# Patient Record
Sex: FEMALE | Race: WHITE | NOT HISPANIC OR LATINO | URBAN - NONMETROPOLITAN AREA
[De-identification: names, ages, dates, MRNs, and addresses within clinical notes are randomized per-mention and may not be internally consistent; named-entity substitution may affect disease eponyms.]

---

## 2019-02-25 ENCOUNTER — IMPORTED ENCOUNTER (OUTPATIENT)
Dept: URBAN - NONMETROPOLITAN AREA CLINIC 1 | Facility: CLINIC | Age: 59
End: 2019-02-25

## 2019-02-25 PROCEDURE — 92014 COMPRE OPH EXAM EST PT 1/>: CPT

## 2019-02-25 NOTE — PATIENT DISCUSSION
*Diabetic S  Retinopathy:. -  Discussed findings of exam in detail with the patient. -  discussed the risk of diabetic damage of the retina with potential vision loss and the importance of routine follow-up. Hyperopia-Discussed diagnosis with patient. Presbyopia-Discussed diagnosis with patient. Updated spec Rx given. Recommend lens that will provide comfort as well as protect safety and health of eyes.

## 2020-02-26 ENCOUNTER — IMPORTED ENCOUNTER (OUTPATIENT)
Dept: URBAN - NONMETROPOLITAN AREA CLINIC 1 | Facility: CLINIC | Age: 60
End: 2020-02-26

## 2020-02-26 PROCEDURE — 92015 DETERMINE REFRACTIVE STATE: CPT

## 2020-02-26 PROCEDURE — 92014 COMPRE OPH EXAM EST PT 1/>: CPT

## 2021-03-03 ENCOUNTER — IMPORTED ENCOUNTER (OUTPATIENT)
Dept: URBAN - NONMETROPOLITAN AREA CLINIC 1 | Facility: CLINIC | Age: 61
End: 2021-03-03

## 2021-03-03 PROCEDURE — 92014 COMPRE OPH EXAM EST PT 1/>: CPT

## 2021-03-03 NOTE — PATIENT DISCUSSION
DM s DR-Stressed the importance of keeping blood sugars under control blood pressure under control and weight normalization and regular visits with PCP. -Explained the possible effects of poorly controlled diabetes and the damage that diabetes can cause to ocular health. -Patient to check HgbA1C.-Pt instructed to contact our office with any vision changes. Hyperopia-Discussed diagnosis with patient. Presbyopia-Discussed diagnosis with patient. Updated spec Rx given. Recommend lens that will provide comfort as well as protect safety and health of eyes.

## 2021-08-02 ENCOUNTER — IMPORTED ENCOUNTER (OUTPATIENT)
Dept: URBAN - NONMETROPOLITAN AREA CLINIC 1 | Facility: CLINIC | Age: 61
End: 2021-08-02

## 2021-08-02 PROBLEM — H16.223: Noted: 2021-08-02

## 2021-08-02 PROBLEM — H52.4: Noted: 2021-08-02

## 2021-08-02 PROBLEM — H25.813: Noted: 2021-08-02

## 2021-08-02 PROBLEM — H52.03: Noted: 2021-08-02

## 2021-08-02 PROCEDURE — 92015 DETERMINE REFRACTIVE STATE: CPT

## 2021-08-02 PROCEDURE — 92014 COMPRE OPH EXAM EST PT 1/>: CPT

## 2021-08-02 NOTE — PATIENT DISCUSSION
DM s DR-Stressed the importance of keeping blood sugars under control blood pressure under control and weight normalization and regular visits with PCP. -Explained the possible effects of poorly controlled diabetes and the damage that diabetes can cause to ocular health. -Patient to check HgbA1C.-Pt instructed to contact our office with any vision changes. Cataract OU-Not yet surgical. -Reviewed symptoms of advancing cataract growth such as glare and halos and decreased vision.-Continue to monitor for now. Pt will notify us if any new symptoms develop. Jason Discussed restasis and xiidra START Restasis bid OU sent to jonas Hyperopia-Discussed diagnosis with patient. Updated spec Rx given. Recommend lens that will provide comfort as well as protect safety and health of eyes.

## 2022-04-09 ASSESSMENT — VISUAL ACUITY
OS_SC: 20/25
OU_CC: 20/20
OD_SC: 20/25
OS_SC: 20/25
OD_SC: 20/25
OD_SC: 20/20
OD_SC: 20/25
OD_CC: J1
OS_PH: 20/30
OS_CC: J1
OU_CC: J1+
OS_CC: 20/80-1
OS_SC: 20/40
OU_CC: J1+
OS_SC: 20/25

## 2022-04-09 ASSESSMENT — TONOMETRY
OD_IOP_MMHG: 16
OS_IOP_MMHG: 16
OD_IOP_MMHG: 17
OD_IOP_MMHG: 16
OS_IOP_MMHG: 17
OD_IOP_MMHG: 16

## 2023-10-16 ENCOUNTER — COMPREHENSIVE EXAM (OUTPATIENT)
Dept: RURAL CLINIC 1 | Facility: CLINIC | Age: 63
End: 2023-10-16

## 2023-10-16 DIAGNOSIS — H52.03: ICD-10-CM

## 2023-10-16 DIAGNOSIS — H52.4: ICD-10-CM

## 2023-10-16 DIAGNOSIS — H25.813: ICD-10-CM

## 2023-10-16 DIAGNOSIS — E11.9: ICD-10-CM

## 2023-10-16 PROCEDURE — 92015 DETERMINE REFRACTIVE STATE: CPT | Mod: NC

## 2023-10-16 PROCEDURE — 99214 OFFICE O/P EST MOD 30 MIN: CPT

## 2023-10-16 ASSESSMENT — TONOMETRY
OD_IOP_MMHG: 16
OS_IOP_MMHG: 16

## 2023-10-16 ASSESSMENT — VISUAL ACUITY
OD_CC: 20/30
OU_CC: 20/30
OS_CC: 20/100
OS_PH: 20/70

## 2023-11-02 ENCOUNTER — CONSULTATION/EVALUATION (OUTPATIENT)
Dept: RURAL CLINIC 1 | Facility: CLINIC | Age: 63
End: 2023-11-02

## 2023-11-02 DIAGNOSIS — H25.813: ICD-10-CM

## 2023-11-02 PROCEDURE — 92136 OPHTHALMIC BIOMETRY: CPT

## 2023-11-02 PROCEDURE — 99214 OFFICE O/P EST MOD 30 MIN: CPT

## 2023-11-02 PROCEDURE — 92025 CPTRIZED CORNEAL TOPOGRAPHY: CPT

## 2023-11-02 ASSESSMENT — TONOMETRY
OD_IOP_MMHG: 16
OS_IOP_MMHG: 161

## 2023-11-02 ASSESSMENT — VISUAL ACUITY
OU_CC: 20/20
OD_PH: 20/70
OD_SC: 20/200
OD_CC: 20/30
OS_SC: 20/400
OS_PH: 20/80
OU_CC: 20/30
OD_PAM: 20/25
OS_AM: 20/25
OS_CC: 20/20
OU_SC: 20/80
OS_CC: 20/70
OD_CC: 20/20

## 2023-11-16 ENCOUNTER — PRE-OP/H&P (OUTPATIENT)
Dept: RURAL CLINIC 1 | Facility: CLINIC | Age: 63
End: 2023-11-16

## 2023-11-16 VITALS
SYSTOLIC BLOOD PRESSURE: 122 MMHG | BODY MASS INDEX: 30.18 KG/M2 | HEIGHT: 62 IN | WEIGHT: 164 LBS | DIASTOLIC BLOOD PRESSURE: 78 MMHG | HEART RATE: 86 BPM

## 2023-11-16 DIAGNOSIS — H25.813: ICD-10-CM

## 2023-11-16 DIAGNOSIS — Z01.818: ICD-10-CM

## 2023-11-16 PROCEDURE — 92134 CPTRZ OPH DX IMG PST SGM RTA: CPT

## 2023-11-16 PROCEDURE — 99499 UNLISTED E&M SERVICE: CPT

## 2023-11-28 ENCOUNTER — SURGERY/PROCEDURE (OUTPATIENT)
Dept: URBAN - METROPOLITAN AREA SURGERY 3 | Facility: SURGERY | Age: 63
End: 2023-11-28

## 2023-11-28 DIAGNOSIS — H25.812: ICD-10-CM

## 2023-11-28 PROCEDURE — 68841 INSJ RX ELUT IMPLT LAC CANAL: CPT

## 2023-11-28 PROCEDURE — 66984 XCAPSL CTRC RMVL W/O ECP: CPT

## 2023-11-29 ENCOUNTER — POST-OP (OUTPATIENT)
Dept: RURAL CLINIC 1 | Facility: CLINIC | Age: 63
End: 2023-11-29

## 2023-11-29 DIAGNOSIS — H25.811: ICD-10-CM

## 2023-11-29 DIAGNOSIS — Z96.1: ICD-10-CM

## 2023-11-29 PROCEDURE — 99024 POSTOP FOLLOW-UP VISIT: CPT

## 2023-11-29 ASSESSMENT — TONOMETRY: OS_IOP_MMHG: 16

## 2023-11-29 ASSESSMENT — VISUAL ACUITY: OS_SC: 20/25

## 2023-12-05 ENCOUNTER — POST OP/EVAL OF SECOND EYE (OUTPATIENT)
Dept: RURAL CLINIC 1 | Facility: CLINIC | Age: 63
End: 2023-12-05

## 2023-12-05 DIAGNOSIS — Z96.1: ICD-10-CM

## 2023-12-05 DIAGNOSIS — H25.811: ICD-10-CM

## 2023-12-05 PROCEDURE — 99213 OFFICE O/P EST LOW 20 MIN: CPT

## 2023-12-05 ASSESSMENT — VISUAL ACUITY
OS_SC: 20/40
OD_SC: 20/200

## 2023-12-05 ASSESSMENT — TONOMETRY
OS_IOP_MMHG: 19
OD_IOP_MMHG: 14

## 2023-12-12 ENCOUNTER — SURGERY/PROCEDURE (OUTPATIENT)
Dept: URBAN - METROPOLITAN AREA SURGERY 3 | Facility: SURGERY | Age: 63
End: 2023-12-12

## 2023-12-12 DIAGNOSIS — H25.811: ICD-10-CM

## 2023-12-12 PROCEDURE — 66984 XCAPSL CTRC RMVL W/O ECP: CPT

## 2023-12-12 PROCEDURE — 68841 INSJ RX ELUT IMPLT LAC CANAL: CPT

## 2023-12-13 ENCOUNTER — POST-OP (OUTPATIENT)
Dept: RURAL CLINIC 1 | Facility: CLINIC | Age: 63
End: 2023-12-13

## 2023-12-13 DIAGNOSIS — Z96.1: ICD-10-CM

## 2023-12-13 PROCEDURE — 99024 POSTOP FOLLOW-UP VISIT: CPT

## 2023-12-13 ASSESSMENT — TONOMETRY: OD_IOP_MMHG: 16

## 2023-12-13 ASSESSMENT — VISUAL ACUITY: OD_SC: 20/50

## 2023-12-19 ENCOUNTER — POST-OP (OUTPATIENT)
Dept: RURAL CLINIC 1 | Facility: CLINIC | Age: 63
End: 2023-12-19

## 2023-12-19 DIAGNOSIS — Z96.1: ICD-10-CM

## 2023-12-19 PROCEDURE — 99024 POSTOP FOLLOW-UP VISIT: CPT

## 2023-12-19 ASSESSMENT — VISUAL ACUITY
OU_SC: 20/100
OD_SC: 20/25-1
OD_SC: 20/100
OU_SC: 20/20
OS_SC: 20/100
OS_SC: 20/20

## 2023-12-19 ASSESSMENT — TONOMETRY
OS_IOP_MMHG: 16
OD_IOP_MMHG: 16

## 2024-04-23 ENCOUNTER — FOLLOW UP (OUTPATIENT)
Dept: RURAL CLINIC 1 | Facility: CLINIC | Age: 64
End: 2024-04-23

## 2024-04-23 DIAGNOSIS — Z96.1: ICD-10-CM

## 2024-04-23 DIAGNOSIS — E11.9: ICD-10-CM

## 2024-04-23 DIAGNOSIS — H16.223: ICD-10-CM

## 2024-04-23 PROCEDURE — 92014 COMPRE OPH EXAM EST PT 1/>: CPT

## 2024-04-23 ASSESSMENT — TONOMETRY
OS_IOP_MMHG: 16
OD_IOP_MMHG: 16

## 2024-04-23 ASSESSMENT — VISUAL ACUITY
OD_SC: 20/30
OS_SC: 20/25

## 2025-04-24 ENCOUNTER — COMPREHENSIVE EXAM (OUTPATIENT)
Age: 65
End: 2025-04-24

## 2025-04-24 DIAGNOSIS — H16.223: ICD-10-CM

## 2025-04-24 DIAGNOSIS — E11.9: ICD-10-CM

## 2025-04-24 DIAGNOSIS — Z96.1: ICD-10-CM

## 2025-04-24 PROCEDURE — 92014 COMPRE OPH EXAM EST PT 1/>: CPT
